# Patient Record
Sex: FEMALE | Race: OTHER | NOT HISPANIC OR LATINO | URBAN - METROPOLITAN AREA
[De-identification: names, ages, dates, MRNs, and addresses within clinical notes are randomized per-mention and may not be internally consistent; named-entity substitution may affect disease eponyms.]

---

## 2017-05-03 ENCOUNTER — EMERGENCY (EMERGENCY)
Facility: HOSPITAL | Age: 37
LOS: 1 days | Discharge: HOME | End: 2017-05-03
Attending: EMERGENCY MEDICINE
Payer: COMMERCIAL

## 2017-05-03 VITALS
RESPIRATION RATE: 16 BRPM | HEART RATE: 80 BPM | SYSTOLIC BLOOD PRESSURE: 115 MMHG | DIASTOLIC BLOOD PRESSURE: 72 MMHG | OXYGEN SATURATION: 100 %

## 2017-05-03 VITALS
TEMPERATURE: 98 F | WEIGHT: 149.91 LBS | SYSTOLIC BLOOD PRESSURE: 116 MMHG | HEIGHT: 63 IN | DIASTOLIC BLOOD PRESSURE: 107 MMHG | RESPIRATION RATE: 16 BRPM | HEART RATE: 80 BPM
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
## 2017-05-03 DIAGNOSIS — S61.230A PUNCTURE WOUND WITHOUT FOREIGN BODY OF RIGHT INDEX FINGER WITHOUT DAMAGE TO NAIL, INITIAL ENCOUNTER: ICD-10-CM

## 2017-05-03 PROCEDURE — 99283 EMERGENCY DEPT VISIT LOW MDM: CPT

## 2017-05-03 PROCEDURE — 99282 EMERGENCY DEPT VISIT SF MDM: CPT

## 2017-05-03 NOTE — ED PROVIDER NOTE - PROGRESS NOTE DETAILS
Superificial puncture wound, no FB. Soaked in NS/iodine solution and cleaned. Explained s/s of infection and when to return if needed. Will need to follow up with PMD. Pt is well appearing walking with steady gait, stable for discharge and follow up without fail with medical doctor. I had a detailed discussion with the patient and/or guardian regarding the historical points, exam findings, and any diagnostic results supporting the discharge diagnosis. Pt educated on care and need for follow up. Strict return instructions and red flag signs and symptoms discussed with patient. Questions answered. Pt shows understanding of discharge information and agrees to follow.

## 2017-05-03 NOTE — ED ADULT NURSE NOTE - OBJECTIVE STATEMENT
Patient complains of pain to right second finger s/p picking up a broken glass vase. + tingling sensation to right index finger. + right radial pulse. Patient complains of pain to right second finger s/p picking up a broken glass vase. + tingling sensation to right index finger. + right radial pulse. Small puncture wound to right index finger.

## 2017-05-03 NOTE — ED PROVIDER NOTE - PHYSICAL EXAMINATION
Right index distal phalanx volar surface superficial puncture wound, no FB, no bleeding. full range of motion of finger. Cap. refill < 2 sec.

## 2017-05-03 NOTE — ED PROVIDER NOTE - MEDICAL DECISION MAKING DETAILS
37 year-old presents with superficial puncture wound to right index finger. Well-appearing, no FB on exam. Tetanus up to date. Plan: soak in NS/iodine and discharge with PMD follow up.

## 2017-05-03 NOTE — ED PROVIDER NOTE - ATTENDING CONTRIBUTION TO CARE
37 year-old female with no significant PMHx, presents with cc right index finger wound. While in a store, the patient grabbed a vase and injured her finger on a glass splinter. Bleeding resolved prior to arrival to ED. On exam, +superficial puncture wound.  No active bleeding, no repair needed.  Tetanus up to date.  Will irrigate wound, and d/c.

## 2017-05-03 NOTE — ED PROVIDER NOTE - OBJECTIVE STATEMENT
37 year-old female, no significant PMHx, presents with cc right index puncture wound. While in store, grabbed a vase and injured finger on a glass splinter. Had mild bleeding that resolved. Denies any pain or any other complaints. Tetanus immunization up to date.
